# Patient Record
Sex: FEMALE | Race: BLACK OR AFRICAN AMERICAN | NOT HISPANIC OR LATINO | Employment: FULL TIME | URBAN - METROPOLITAN AREA
[De-identification: names, ages, dates, MRNs, and addresses within clinical notes are randomized per-mention and may not be internally consistent; named-entity substitution may affect disease eponyms.]

---

## 2021-11-23 ENCOUNTER — OFFICE VISIT (OUTPATIENT)
Dept: OBSTETRICS AND GYNECOLOGY | Age: 21
End: 2021-11-23

## 2021-11-23 VITALS
DIASTOLIC BLOOD PRESSURE: 70 MMHG | SYSTOLIC BLOOD PRESSURE: 112 MMHG | BODY MASS INDEX: 23.05 KG/M2 | HEIGHT: 66 IN | WEIGHT: 143.4 LBS

## 2021-11-23 DIAGNOSIS — N89.8 VAGINAL ODOR: ICD-10-CM

## 2021-11-23 DIAGNOSIS — R10.2 VULVAR PAIN: Primary | ICD-10-CM

## 2021-11-23 DIAGNOSIS — Z20.2 EXPOSURE TO STD: ICD-10-CM

## 2021-11-23 PROCEDURE — 99203 OFFICE O/P NEW LOW 30 MIN: CPT | Performed by: OBSTETRICS & GYNECOLOGY

## 2021-11-23 RX ORDER — NORETHINDRONE ACETATE AND ETHINYL ESTRADIOL 1; .02 MG/1; MG/1
TABLET ORAL
COMMUNITY
Start: 2021-09-18 | End: 2023-02-07

## 2021-11-23 NOTE — PROGRESS NOTES
"Chief complaint:    HPI  Denise Terrazas is a 20 y.o. female presents for new patient visit. 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 She has noticed vulvar pain and pain with intercourse for a few months. She note she has \"cuts\" on the vulva at times but it not sure how they got there. She notes the symptoms see worse after intercourse and she believes her thong underwear may rub the area. She has had new sexual partners in the months prior to symptoms. She denies itching or excessive discharge but she notes some vaginal odor at times.  She would also like STD screening today.    She has regular light menses on ocp's.         The following portions of the patient's history were reviewed and updated as appropriate: allergies, current medications, past family history, past medical history, past social history, past surgical history and problem list.    Review of Systems  Constitutional: negative for fevers  Genitourinary:positive for vulvar pain and vag odor, negative for pelvic pain, abnormal or excessive bleeding  Hematologic/lymphatic: negative for excessive bleeding/bruising    /70   Ht 167.6 cm (66\")   Wt 65 kg (143 lb 6.4 oz)   LMP 11/16/2021 (Exact Date)   Breastfeeding No   BMI 23.15 kg/m²         Physical Exam  Constitutional:       Appearance: Normal appearance.   Pulmonary:      Effort: Pulmonary effort is normal.   Abdominal:      General: There is no distension.      Tenderness: There is no abdominal tenderness.   Genitourinary:     Comments: Normal external female genitalia. No lesions noted. Normal vagina and cervix. No CMT.   Neurological:      Mental Status: She is alert and oriented to person, place, and time.   Psychiatric:         Behavior: Behavior normal.             Diagnoses and all orders for this visit:    1. Vulvar pain (Primary)  -     HSV Non-Specific Antibody, IgM  -     HSV 1 & 2 - Specific Antibody, IgG  -     NuSwab VG+ & HSV    2. Vaginal odor  -     NuSwab VG+ & " HSV    3. Exposure to STD  -     HSV Non-Specific Antibody, IgM  -     HSV 1 & 2 - Specific Antibody, IgG  -     Hepatitis B Surface Antigen  -     RPR  -     HIV-1 / O / 2 Ag / Antibody 4th Generation  -     NuSwab VG+ & HSV      F/u 5 weeks to re-check. Discussed avoidance of products/clothing like thongs that may irritate further.

## 2021-11-24 LAB
HBV SURFACE AG SERPL QL IA: NEGATIVE
HIV 1+2 AB+HIV1 P24 AG SERPL QL IA: NON REACTIVE
HSV1 IGG SER IA-ACNC: <0.91 INDEX (ref 0–0.9)
HSV1+2 IGM SER IA-ACNC: <0.91 RATIO (ref 0–0.9)
HSV2 IGG SER IA-ACNC: <0.91 INDEX (ref 0–0.9)
RPR SER QL: NON REACTIVE

## 2021-11-30 LAB
A VAGINAE DNA VAG QL NAA+PROBE: NORMAL SCORE
BVAB2 DNA VAG QL NAA+PROBE: NORMAL SCORE
C ALBICANS DNA VAG QL NAA+PROBE: NEGATIVE
C GLABRATA DNA VAG QL NAA+PROBE: NEGATIVE
C TRACH DNA VAG QL NAA+PROBE: NEGATIVE
HSV1 DNA SPEC QL NAA+PROBE: NEGATIVE
HSV2 DNA SPEC QL NAA+PROBE: NEGATIVE
MEGA1 DNA VAG QL NAA+PROBE: NORMAL SCORE
N GONORRHOEA DNA VAG QL NAA+PROBE: NEGATIVE
T VAGINALIS DNA VAG QL NAA+PROBE: NEGATIVE

## 2021-12-30 ENCOUNTER — TELEPHONE (OUTPATIENT)
Dept: OBSTETRICS AND GYNECOLOGY | Age: 21
End: 2021-12-30

## 2021-12-30 ENCOUNTER — OFFICE VISIT (OUTPATIENT)
Dept: OBSTETRICS AND GYNECOLOGY | Age: 21
End: 2021-12-30

## 2021-12-30 VITALS
BODY MASS INDEX: 22.43 KG/M2 | DIASTOLIC BLOOD PRESSURE: 72 MMHG | SYSTOLIC BLOOD PRESSURE: 106 MMHG | WEIGHT: 139.6 LBS | HEIGHT: 66 IN

## 2021-12-30 DIAGNOSIS — Z32.00 POSSIBLE PREGNANCY, NOT YET CONFIRMED: ICD-10-CM

## 2021-12-30 DIAGNOSIS — R82.90 ABNORMAL URINE ODOR: ICD-10-CM

## 2021-12-30 DIAGNOSIS — R10.2 VULVAR PAIN: Primary | ICD-10-CM

## 2021-12-30 LAB
B-HCG UR QL: POSITIVE
BILIRUB BLD-MCNC: ABNORMAL MG/DL
EXPIRATION DATE: ABNORMAL
GLUCOSE UR STRIP-MCNC: NEGATIVE MG/DL
HCG INTACT+B SERPL-ACNC: <1 MIU/ML
INTERNAL NEGATIVE CONTROL: NEGATIVE
INTERNAL POSITIVE CONTROL: POSITIVE
KETONES UR QL: NEGATIVE
LEUKOCYTE EST, POC: NEGATIVE
Lab: ABNORMAL
NITRITE UR-MCNC: NEGATIVE MG/ML
PH UR: 6 [PH] (ref 5–8)
PROT UR STRIP-MCNC: NEGATIVE MG/DL
RBC # UR STRIP: ABNORMAL /UL
SP GR UR: 1.03 (ref 1–1.03)
UROBILINOGEN UR QL: NORMAL

## 2021-12-30 PROCEDURE — 99213 OFFICE O/P EST LOW 20 MIN: CPT | Performed by: OBSTETRICS & GYNECOLOGY

## 2021-12-30 PROCEDURE — 81002 URINALYSIS NONAUTO W/O SCOPE: CPT | Performed by: OBSTETRICS & GYNECOLOGY

## 2021-12-30 PROCEDURE — 81025 URINE PREGNANCY TEST: CPT | Performed by: OBSTETRICS & GYNECOLOGY

## 2021-12-30 RX ORDER — LANOLIN ALCOHOL/MO/W.PET/CERES
325 CREAM (GRAM) TOPICAL DAILY
COMMUNITY
Start: 2021-11-24 | End: 2022-11-24

## 2021-12-30 NOTE — PROGRESS NOTES
"Chief complaint:vulvar pain    HPI  Denise Terrazas is a 21 y.o. female presents for f/u visit. She notes she changed to cotton underwear and has not had vulvar pain since her last visit. She continue her ocp's and notes regular menses.  She reported occ urinary odor. She denies dysuria or frequency. She denies pelvic pain.             The following portions of the patient's history were reviewed and updated as appropriate: allergies, current medications, past family history, past medical history, past social history, past surgical history and problem list.    Review of Systems  Pertinent items are noted in HPI.    /72   Ht 167.6 cm (66\")   Wt 63.3 kg (139 lb 9.6 oz)   LMP 12/21/2021 (Exact Date)   Breastfeeding No   BMI 22.53 kg/m²         Physical Exam  Constitutional:       Appearance: Normal appearance.   Pulmonary:      Effort: Pulmonary effort is normal.   Abdominal:      General: There is no distension.      Palpations: Abdomen is soft.      Tenderness: There is no abdominal tenderness. There is no guarding.   Neurological:      General: No focal deficit present.      Mental Status: She is alert and oriented to person, place, and time.   Psychiatric:         Mood and Affect: Mood normal.         Behavior: Behavior normal.         Urine HCG done and faint positive. Repeat done and faint positive as well.      Diagnoses and all orders for this visit:    1. Vulvar pain (Primary)  -     POC Urinalysis Dipstick  -     POC Pregnancy, Urine    2. Abnormal urine odor  -     POC Urinalysis Dipstick  -     POC Pregnancy, Urine    3. Possible pregnancy, not yet confirmed  -     HCG, B-subunit, Quantitative      UA obtained due to complaints regarding odor. Trace blood noted and urine preg test obtained and faintly positive. Pt denies missed menses or pills. Will proceed with Stat serum HCG and pt agrees. If serum testing is negative, plan routine f/u as her vulvar symptoms resolved.     Addendum: serum HCG " results are negative and pt contacted and results reviewed.

## 2022-11-22 ENCOUNTER — TELEPHONE (OUTPATIENT)
Dept: OBSTETRICS AND GYNECOLOGY | Facility: CLINIC | Age: 22
End: 2022-11-22

## 2022-11-22 NOTE — TELEPHONE ENCOUNTER
Provider: DR MARTINI  Caller: BOBBY OWEN  Relationship to Patient: SELF  Phone Number: [phone]  Reason for Call: PATIENT MOTHER IS A PATIENT OF DR MARTINI AND REFERRED HER//NEW GYN -PATIENT IS HAVING IRREGULAR PERIODS SINCE STOPPING BC IN June-ONLY HAD A PERIOD IN July UNTIL YESTERDAY AND PATIENT STARTED AND IS BLEEDING HEAVY//PATIENT ADV THAT IS BLEEDING THROUGH A PAD IN 1 HR//UNABLE TO WARM TRANSFER OFFICE IS CLOSED//ADV PATIENT TO GOT TO ER SINCE AS BEEN BLEEDING HEAVY FOR EVAL//I SCHDULED WITH [facility] IN FEB BUT WAS TRYING TO SEE IF CAN BE SEEN SOONER//

## 2023-02-07 ENCOUNTER — OFFICE VISIT (OUTPATIENT)
Dept: OBSTETRICS AND GYNECOLOGY | Facility: CLINIC | Age: 23
End: 2023-02-07
Payer: COMMERCIAL

## 2023-02-07 VITALS — WEIGHT: 131 LBS | HEIGHT: 66 IN | BODY MASS INDEX: 21.05 KG/M2

## 2023-02-07 DIAGNOSIS — Z11.3 SCREENING EXAMINATION FOR STD (SEXUALLY TRANSMITTED DISEASE): ICD-10-CM

## 2023-02-07 DIAGNOSIS — Z01.419 WELL WOMAN EXAM WITH ROUTINE GYNECOLOGICAL EXAM: Primary | ICD-10-CM

## 2023-02-07 DIAGNOSIS — N92.6 IRREGULAR PERIODS: ICD-10-CM

## 2023-02-07 PROCEDURE — 99395 PREV VISIT EST AGE 18-39: CPT | Performed by: STUDENT IN AN ORGANIZED HEALTH CARE EDUCATION/TRAINING PROGRAM

## 2023-02-07 PROCEDURE — 99214 OFFICE O/P EST MOD 30 MIN: CPT | Performed by: STUDENT IN AN ORGANIZED HEALTH CARE EDUCATION/TRAINING PROGRAM

## 2023-02-07 NOTE — PROGRESS NOTES
"GYN Annual Exam     CC- Here for annual exam.     Denise Terrazas is a 22 y.o. female who presents for annual well woman exam. Periods are irregular. She reports that she stopped birth control pills a year ago after starting them at age 14 for management of acne and irregular periods. Following discontinuation, she began to have irregular periods again and had no period from  to November. She then had a very heavy menstrual period in November lasting 6-7 days that was so heavy that she had to wear a pad and tampon. She then skipped the month of December and had some spotting in January. She most recently started her period on 22. She has moderate dysmenorrhea with her periods.       OB History        0    Para   0    Term   0       0    AB   0    Living   0       SAB   0    IAB   0    Ectopic   0    Molar   0    Multiple   0    Live Births   0              Menarche at age 8.   Currently sexually active with one male partner.   Current contraception: condoms  History of abnormal Pap smear: no  Family history of uterine, colon or ovarian cancer: no  History of abnormal mammogram: n/a  Family history of breast cancer: yes - Mother diagnosed with breast cancer at age 49-50.  Last Pap : 20- NILM   She has had the HPV vaccine.     Past Medical History:   Diagnosis Date   • Anemia        Past Surgical History:   Procedure Laterality Date   • WISDOM TOOTH EXTRACTION         No current outpatient medications on file.    No Known Allergies    Social History     Tobacco Use   • Smoking status: Never   Substance Use Topics   • Alcohol use: Yes     Comment: social   • Drug use: Never       Family History   Problem Relation Age of Onset   • Breast cancer Mother    • Hypertension Maternal Grandmother    • Diabetes Maternal Grandmother        Review of Systems   Genitourinary: Positive for menstrual problem.   All other systems reviewed and are negative.      Ht 167.6 cm (65.98\")   Wt 59.4 kg (131 lb)  "  LMP 02/06/2023   BMI 21.15 kg/m²     Physical Exam  Vitals reviewed. Exam conducted with a chaperone present.   Constitutional:       General: She is not in acute distress.  HENT:      Head: Normocephalic and atraumatic.      Right Ear: External ear normal.      Left Ear: External ear normal.   Eyes:      Extraocular Movements: Extraocular movements intact.      Pupils: Pupils are equal, round, and reactive to light.   Cardiovascular:      Rate and Rhythm: Normal rate.   Pulmonary:      Effort: Pulmonary effort is normal. No respiratory distress.   Chest:   Breasts:     Right: No swelling, bleeding, inverted nipple, mass, nipple discharge, skin change or tenderness.      Left: No swelling, bleeding, inverted nipple, mass, nipple discharge, skin change or tenderness.   Abdominal:      General: There is no distension.      Palpations: Abdomen is soft.      Tenderness: There is no abdominal tenderness. There is no guarding or rebound.   Genitourinary:     General: Normal vulva.      Exam position: Lithotomy position.      Labia:         Right: No rash, tenderness, lesion or injury.         Left: No rash, tenderness, lesion or injury.       Urethra: No prolapse or urethral swelling.      Vagina: Bleeding (Moderate amount of dark blood present in vaginal vault.) present. No vaginal discharge, erythema, tenderness or lesions.      Cervix: Normal.      Uterus: Not enlarged, not fixed and not tender.       Adnexa:         Right: No mass, tenderness or fullness.          Left: No mass, tenderness or fullness.     Musculoskeletal:         General: No deformity. Normal range of motion.      Cervical back: Normal range of motion and neck supple.   Lymphadenopathy:      Upper Body:      Right upper body: No supraclavicular or axillary adenopathy.      Left upper body: No supraclavicular or axillary adenopathy.      Lower Body: No right inguinal adenopathy. No left inguinal adenopathy.   Skin:     General: Skin is warm and  dry.   Neurological:      General: No focal deficit present.      Mental Status: She is alert and oriented to person, place, and time.   Psychiatric:         Mood and Affect: Mood normal.         Behavior: Behavior normal.              Assessment     1) GYN annual well woman exam.   2) Irregular periods  3) STD screening     Plan     1) Breast Health - Clinical breast exam yearly, Self breast awareness monthly  2) Pap - Up to date.   3) Smoking status- non-smoker   4) Activity recommends - Adult 150-300 min/week of multi-component physical activities that include balance training, aerobic and physical strengthening.    5) Irregular periods- Will obtain TSH, Prolactin, Testosterone total, FSH and pelvic ultrasound for evaluation of irregular periods. Will have the patient undergo ultrasound in 1-2 weeks and review results and next steps of management.   6) STD screening- Collected GC/CT/trich swab for STD screening.   7 ) Follow up in 1-2 weeks for pelvic ultrasound and follow up of irregular periods.       Janice Tam MD

## 2023-02-08 LAB
C TRACH RRNA SPEC QL NAA+PROBE: NEGATIVE
FSH SERPL-ACNC: 4.1 MIU/ML
N GONORRHOEA RRNA SPEC QL NAA+PROBE: NEGATIVE
PROLACTIN SERPL-MCNC: 16.6 NG/ML (ref 4.8–23.3)
T VAGINALIS RRNA SPEC QL NAA+PROBE: NEGATIVE
TESTOST SERPL-MCNC: 35 NG/DL (ref 13–71)
TSH SERPL DL<=0.005 MIU/L-ACNC: 1.25 UIU/ML (ref 0.27–4.2)

## 2023-02-09 ENCOUNTER — PATIENT ROUNDING (BHMG ONLY) (OUTPATIENT)
Dept: OBSTETRICS AND GYNECOLOGY | Facility: CLINIC | Age: 23
End: 2023-02-09
Payer: COMMERCIAL

## 2023-02-09 ENCOUNTER — PATIENT MESSAGE (OUTPATIENT)
Dept: OBSTETRICS AND GYNECOLOGY | Facility: CLINIC | Age: 23
End: 2023-02-09
Payer: COMMERCIAL

## 2023-02-21 ENCOUNTER — OFFICE VISIT (OUTPATIENT)
Dept: OBSTETRICS AND GYNECOLOGY | Facility: CLINIC | Age: 23
End: 2023-02-21
Payer: COMMERCIAL

## 2023-02-21 VITALS
HEIGHT: 66 IN | BODY MASS INDEX: 21.05 KG/M2 | WEIGHT: 131 LBS | DIASTOLIC BLOOD PRESSURE: 76 MMHG | SYSTOLIC BLOOD PRESSURE: 109 MMHG

## 2023-02-21 DIAGNOSIS — E28.2 PCOS (POLYCYSTIC OVARIAN SYNDROME): ICD-10-CM

## 2023-02-21 DIAGNOSIS — N92.6 IRREGULAR PERIODS: Primary | ICD-10-CM

## 2023-02-21 PROCEDURE — 99213 OFFICE O/P EST LOW 20 MIN: CPT | Performed by: STUDENT IN AN ORGANIZED HEALTH CARE EDUCATION/TRAINING PROGRAM

## 2023-02-21 RX ORDER — MEDROXYPROGESTERONE ACETATE 10 MG/1
10 TABLET ORAL DAILY
Qty: 10 TABLET | Refills: 6 | Status: SHIPPED | OUTPATIENT
Start: 2023-02-21 | End: 2023-03-03

## 2024-01-28 ENCOUNTER — HOSPITAL ENCOUNTER (EMERGENCY)
Facility: HOSPITAL | Age: 24
Discharge: HOME | End: 2024-01-28
Admitting: EMERGENCY MEDICINE
Payer: OTHER MISCELLANEOUS

## 2024-01-28 VITALS
OXYGEN SATURATION: 100 % | SYSTOLIC BLOOD PRESSURE: 91 MMHG | RESPIRATION RATE: 18 BRPM | DIASTOLIC BLOOD PRESSURE: 63 MMHG | TEMPERATURE: 97 F | HEART RATE: 101 BPM

## 2024-01-28 DIAGNOSIS — S61.112A LACERATION OF LEFT THUMB WITHOUT FOREIGN BODY WITH DAMAGE TO NAIL, INITIAL ENCOUNTER: Primary | ICD-10-CM

## 2024-01-28 PROCEDURE — 99283 EMERGENCY DEPT VISIT LOW MDM: CPT

## 2024-01-28 NOTE — ED PROVIDER NOTES
MD ATTESTATION NOTE    The CALDERON and I have discussed this patient's history, physical exam, MDM, and treatment plan.  I have reviewed the documentation and personally had a face to face interaction with the patient. I affirm the documentation and agree with the treatment and plan.  The attached note describes my personal findings.      I provided a substantive portion of the medical decision making.        Brief HPI: Patient presents for evaluation of left thumb laceration that occurred yesterday.    PHYSICAL EXAM  ED Triage Vitals   Temp Heart Rate Resp BP SpO2   01/28/24 1144 01/28/24 1144 01/28/24 1144 01/28/24 1145 01/28/24 1144   97 °F (36.1 °C) 101 18 91/63 100 %      Temp src Heart Rate Source Patient Position BP Location FiO2 (%)   01/28/24 1144 01/28/24 1144 -- -- --   Tympanic Monitor            GENERAL: Awake and alert, no acute distress  HENT: nares patent  EYES: no scleral icterus  CV: regular rhythm, normal rate  MUSCULOSKELETAL: no deformity, intact flexion and extension at all MCP, PIP, DIP joints left hand.  NEURO: alert, moves all extremities, follows commands  PSYCH:  calm, cooperative  SKIN: warm, dry.  Superficial laceration involving the distal aspect of the left thumb involving the distal aspect of the fingernail.  The distal fingertip is slightly pale with delayed cap refill.  No obvious foreign body.  No bleeding.    Vital signs and nursing notes reviewed.        Medical Decision Making:  ED Course as of 01/28/24 1216   Sun Jan 28, 2024   1211 Patient presents greater than 24 hours after sustaining laceration to left thumb.  Unable to suture repair given delayed presentation.  No evidence of secondary infection at this time.  Patient's Tdap is up-to-date.  Discussed wound care instructions and return precautions. [DC]      ED Course User Index  [DC] Amanda Celis PA             SHARED VISIT: This visit was performed by BOTH a physician and an APC. The substantive portion of the medical  decision making was performed by this attesting physician who made or approved the management plan and takes responsibility for patient management. All studies in the APC note (if performed) were independently interpreted by me.      Andrzej Chavez MD  01/28/24 3647

## 2024-01-28 NOTE — ED PROVIDER NOTES
EMERGENCY DEPARTMENT ENCOUNTER      PCP: Jen Zamorano MD  Patient Care Team:  Jen Zamorano MD as PCP - General (Internal Medicine)   Independent Historians: Patient    HPI:  Chief Complaint: Laceration  A complete HPI/ROS/PMH/PSH/SH/FH are unobtainable due to: None    Chronic or social conditions impacting patient care (social determinants of health): None    Context: Denise Terrazas is a 23 y.o. female who presents to the ED c/o left thumb laceration that occurred yesterday at work around 10 AM.  She states this morning she still having some bleeding which prompted her to be evaluated.  She denies any fevers, chills, purulent drainage or significant pain.  She denies significant past medical history.      Chart review shows Tdap completed on 7/6/2021.    Review of prior external notes and/or external test results outside of this encounter: CMP on 10/14/2021 shows normal renal function, normal LFTs.  Glucose 92.      PAST MEDICAL HISTORY  Active Ambulatory Problems     Diagnosis Date Noted    No Active Ambulatory Problems     Resolved Ambulatory Problems     Diagnosis Date Noted    No Resolved Ambulatory Problems     Past Medical History:   Diagnosis Date    Anemia        The patient has started, but not completed, their COVID-19 vaccination series.    PAST SURGICAL HISTORY  Past Surgical History:   Procedure Laterality Date    WISDOM TOOTH EXTRACTION           FAMILY HISTORY  Family History   Problem Relation Age of Onset    Breast cancer Mother     Hypertension Maternal Grandmother     Diabetes Maternal Grandmother          SOCIAL HISTORY  Social History     Socioeconomic History    Marital status: Single   Tobacco Use    Smoking status: Never   Substance and Sexual Activity    Alcohol use: Yes     Comment: social    Drug use: Never    Sexual activity: Yes     Partners: Male     Birth control/protection: OCP         ALLERGIES  Patient has no known allergies.        REVIEW OF  SYSTEMS  Review of Systems   Constitutional:  Negative for chills and fever.   Skin:  Positive for wound (Left thumb laceration).   Neurological:  Negative for weakness and numbness.        All systems reviewed and negative except for those discussed in HPI.       PHYSICAL EXAM    I have reviewed the triage vital signs and nursing notes.    ED Triage Vitals   Temp Heart Rate Resp BP SpO2   01/28/24 1144 01/28/24 1144 01/28/24 1144 01/28/24 1145 01/28/24 1144   97 °F (36.1 °C) 101 18 91/63 100 %      Temp src Heart Rate Source Patient Position BP Location FiO2 (%)   01/28/24 1144 01/28/24 1144 -- -- --   Tympanic Monitor          Physical Exam  GENERAL: alert, no acute distress  SKIN: Warm, dry  HENT: Normocephalic, atraumatic  EYES: no scleral icterus  CV: regular rhythm, regular rate  RESPIRATORY: normal effort, lungs clear  ABDOMEN: soft, nontender, nondistended  MUSCULOSKELETAL: no deformity, laceration to tip of left thumb through distal aspect of fingernail with no significant active bleeding, full range of motion of MCP, PIP, DIP joints  NEURO: alert, moves all extremities, follows commands          LAB RESULTS  No results found for this or any previous visit (from the past 24 hour(s)).    Ordered the above labs and independently reviewed and interpreted the results.        RADIOLOGY  No Radiology Exams Resulted Within Past 24 Hours    I ordered the above noted radiological studies. Independently reviewed and interpreted by me.  See dictation for official radiology interpretation.      PROCEDURES    Procedures      MEDICATIONS GIVEN IN ER    Medications - No data to display      PROGRESS, DATA ANALYSIS, CONSULTS, AND MEDICAL DECISION MAKING    All labs have been independently reviewed and interpreted by me.  All radiology studies have been independently reviewed and interpreted by me and discussed with radiologist dictating the report.   EKG's independently reviewed and interpreted by me.  Discussion below  represents my analysis of pertinent findings related to patient's condition, differential diagnosis, treatment plan and final disposition.        ED Course as of 01/28/24 1211   Sun Jan 28, 2024   1211 Patient presents greater than 24 hours after sustaining laceration to left thumb.  Unable to suture repair given delayed presentation.  No evidence of secondary infection at this time.  Patient's Tdap is up-to-date.  Discussed wound care instructions and return precautions. [DC]      ED Course User Index  [DC] Amanda Celis PA             AS OF 12:11 EST VITALS:    BP - 91/63  HR - 101  TEMP - 97 °F (36.1 °C) (Tympanic)  O2 SATS - 100%        DIAGNOSIS  Final diagnoses:   Laceration of left thumb without foreign body with damage to nail, initial encounter         DISPOSITION  ED Disposition       ED Disposition   Discharge    Condition   Stable    Comment   --                  Note Disclaimer: At [facility], we believe that sharing information builds trust and better relationships. You are receiving this note because you recently visited [facility]. It is possible you will see health information before a provider has talked with you about it. This kind of information can be easy to misunderstand. To help you fully understand what it means for your health, we urge you to discuss this note with your provider.         Amanda Celis PA  01/28/24 1211